# Patient Record
Sex: FEMALE | Race: WHITE
[De-identification: names, ages, dates, MRNs, and addresses within clinical notes are randomized per-mention and may not be internally consistent; named-entity substitution may affect disease eponyms.]

---

## 2017-01-03 ENCOUNTER — HOSPITAL ENCOUNTER (EMERGENCY)
Dept: HOSPITAL 45 - C.EDB | Age: 42
Discharge: HOME | End: 2017-01-03
Payer: COMMERCIAL

## 2017-01-03 VITALS — TEMPERATURE: 99.14 F

## 2017-01-03 VITALS
BODY MASS INDEX: 37.89 KG/M2 | WEIGHT: 208.56 LBS | HEIGHT: 62.01 IN | WEIGHT: 208.56 LBS | BODY MASS INDEX: 37.89 KG/M2 | HEIGHT: 62.01 IN

## 2017-01-03 VITALS — DIASTOLIC BLOOD PRESSURE: 98 MMHG | SYSTOLIC BLOOD PRESSURE: 142 MMHG | OXYGEN SATURATION: 96 % | HEART RATE: 111 BPM

## 2017-01-03 DIAGNOSIS — I10: ICD-10-CM

## 2017-01-03 DIAGNOSIS — Z98.51: ICD-10-CM

## 2017-01-03 DIAGNOSIS — Z83.3: ICD-10-CM

## 2017-01-03 DIAGNOSIS — Z90.49: ICD-10-CM

## 2017-01-03 DIAGNOSIS — J44.9: ICD-10-CM

## 2017-01-03 DIAGNOSIS — Z80.9: ICD-10-CM

## 2017-01-03 DIAGNOSIS — J20.9: Primary | ICD-10-CM

## 2017-01-03 DIAGNOSIS — Z87.891: ICD-10-CM

## 2017-01-03 DIAGNOSIS — Z87.828: ICD-10-CM

## 2017-01-03 DIAGNOSIS — Z91.81: ICD-10-CM

## 2017-01-03 DIAGNOSIS — R00.0: ICD-10-CM

## 2017-01-03 DIAGNOSIS — D72.829: ICD-10-CM

## 2017-01-03 DIAGNOSIS — J45.909: ICD-10-CM

## 2017-01-03 DIAGNOSIS — Z86.19: ICD-10-CM

## 2017-01-03 DIAGNOSIS — Z87.440: ICD-10-CM

## 2017-01-03 DIAGNOSIS — Z82.49: ICD-10-CM

## 2017-01-03 LAB
ALP SERPL-CCNC: 52 U/L (ref 45–117)
ALT SERPL-CCNC: 30 U/L (ref 12–78)
ANION GAP SERPL CALC-SCNC: 13 MMOL/L (ref 3–11)
AST SERPL-CCNC: 12 U/L (ref 15–37)
BASOPHILS # BLD: 0.03 K/UL (ref 0–0.2)
BASOPHILS NFR BLD: 0.2 %
BUN SERPL-MCNC: 6 MG/DL (ref 7–18)
BUN/CREAT SERPL: 6.9 (ref 10–20)
CALCIUM SERPL-MCNC: 8.8 MG/DL (ref 8.5–10.1)
CHLORIDE SERPL-SCNC: 102 MMOL/L (ref 98–107)
CKMB/CK RATIO: 1.6 (ref 0–3)
CO2 SERPL-SCNC: 23 MMOL/L (ref 21–32)
COMPLETE: YES
CREAT CL PREDICTED SERPL C-G-VRATE: 97 ML/MIN
CREAT SERPL-MCNC: 0.81 MG/DL (ref 0.6–1.2)
EOSINOPHIL NFR BLD AUTO: 317 K/UL (ref 130–400)
GLUCOSE SERPL-MCNC: 103 MG/DL (ref 70–99)
HCT VFR BLD CALC: 40.3 % (ref 37–47)
IG%: 0.3 %
IMM GRANULOCYTES NFR BLD AUTO: 10.6 %
INR PPP: 1 (ref 0.9–1.1)
LYMPHOCYTES # BLD: 1.89 K/UL (ref 1.2–3.4)
MAGNESIUM SERPL-MCNC: 2.2 MG/DL (ref 1.8–2.4)
MCH RBC QN AUTO: 30.3 PG (ref 25–34)
MCHC RBC AUTO-ENTMCNC: 35 G/DL (ref 32–36)
MCV RBC AUTO: 86.7 FL (ref 80–100)
MONOCYTES NFR BLD: 7.1 %
NEUTROPHILS # BLD AUTO: 0.2 %
NEUTROPHILS NFR BLD AUTO: 81.6 %
PARTIAL THROMBOPLASTIN RATIO: 1.2
PMV BLD AUTO: 11 FL (ref 7.4–10.4)
POINT OF CARE TROPONIN I: 0 NG/ML (ref 0–0.04)
POTASSIUM SERPL-SCNC: 3.7 MMOL/L (ref 3.5–5.1)
PROTHROMBIN TIME: 10.9 SECONDS (ref 9–12)
RBC # BLD AUTO: 4.65 M/UL (ref 4.2–5.4)
SODIUM SERPL-SCNC: 138 MMOL/L (ref 136–145)
WBC # BLD AUTO: 17.82 K/UL (ref 4.8–10.8)

## 2017-01-03 NOTE — DIAGNOSTIC IMAGING REPORT
CHEST ONE VIEW PORTABLE



HISTORY:      Short of breath. Tachycardia.



COMPARISON: Chest 1/15/2016.



FINDINGS: The lungs are clear. Cardiac silhouette is normal in size. No pleural

effusions. No pneumothorax.



IMPRESSION:

No acute process.







Electronically signed by:  Joshua Streeter M.D.

1/3/2017 6:30 PM

## 2017-01-03 NOTE — EMERGENCY ROOM VISIT NOTE
History


Report prepared by Angel:  Rebecca Cota


Under the Supervision of:  Dr. Sri Blandon M.D.


First contact with patient:  17:30


Chief Complaint:  COUGH


Stated Complaint:  CHEST BURNING WHEN I COUGH, LT SHOULDER/COLLARBONE


Nursing Triage Summary:  


Chest congestion and burning when coughing.  Cant breathe right when she is 


laying down.  Left shoulder and collarbone are "out"  Has been out since .





History of Present Illness


The patient is a 41 year old female who presents to the Emergency Room with 

complaints of a worsening cough since yesterday morning. She states that her 

chest feels congested and "I can feel my lungs burning when I cough." She rates 

her pain as an 8/10. She is feeling lightheaded from coughing. The patient 

notes low-grade fevers. She has a personal history of asthma. She has been 

using her albuterol inhaler and breathing treatments for her symptoms.





   Source of History:  patient


   Onset:  yesterday morning


   Position:  chest (respiratory)


   Symptom Intensity:  8/10


   Quality:  burning


   Timing:  worsening


   Modifying Factors (Relieving):  other (breathing treatments/albuterol 

inhalers)


   Associated Symptoms:  + fevers


Note:


Pt notes lightheadedness.





Review of Systems


See HPI for pertinent positives & negatives. A total of 10 systems reviewed and 

were otherwise negative.





Past Medical & Surgical


Medical Problems:


(1) Acute bronchitis


(2) Acute bronchitis


(3) Acute bronchitis


(4) Ankle sprain


(5) Asthma exacerbation


(6) Asthma exacerbation


(7) Asthma exacerbation


(8) ASTHMA, UNSPECIFIED


(9) Asthmatic bronchitis


(10) Asthmatic bronchitis


(11) Back pain


(12) Back strain


(13) Bilateral otitis media


(14) Chest pain


(15) CHRONIC OBSTRUCTIVE ASTHMA, W (ACUTE) EXACERBATION


(16) Contusion of back wall of thorax


(17) COPD exacerbation


(18) Fall due to ice or snow


(19) FAM HX-DIABETES MELLITUS


(20) FAM HX-ISCHEM HEART DIS


(21) FAMILY HISTORY OF OTHER CARDIOVASCULAR DISEASES


(22) FAMILY HISTORY OF OTHER CARDIOVASCULAR DISEASES             


(23) FAMILY HX-MALIGNANCY NOS


(24) HISTORY OF TOBACCO USE


(25) Hypertension


(26) LUMBAGO


(27) MIGRAINE UNSPECIFIED W/O INTRACT MGRN W/O STATUS MIGRAINOSUS


(28) Rib pain


(29) Shortness of breath


(30) Spasm of back muscles


(31) urinary tract infection


Surgical Problems:


(1)  section


(2) Cholecystectomy


(3) Tubal ligation








Family History





Cancer


Diabetes mellitus


Heart disease


Hypertension


Lung disease





Social History


Smoking Status:  Former Smoker


Alcohol Use:  none


Drug Use:  none


Marital Status:  in relationship


Housing Status:  lives with friends


Occupation Status:  employed





Current/Historical Medications


Scheduled


Azithromycin (Zithromax Z-Talat), 0 PO UD


Mometasone Furoate-Formoterol (Dulera 100/5 Mcg), 2 PUFFS INH BID


Prednisone (Prednisone), 10 MG PO AS DIRECTED





Scheduled PRN


Albuterol Inhaler (Ventolin Inhaler), 2 PUFFS INH Q4H PRN for SOB/Wheezing


Albuterol Sulf (Proventil 0.083% 2.5MG/3ML), 3 ML NEB QID PRN for SOB/Wheezing


Hydrocodone W/ Homatropine (Hycodan 5/1.5MG 5 Ml), 10 ML PO Q4H PRN for Cough


Ibuprofen (Advil), 1,000 MG PO Q4-6HRS PRN for Pain


Meloxicam (Meloxicam), 15 MG PO DAILY PRN for Arthritis Paim





Allergies


Coded Allergies:  


     No Known Allergies (Unverified , 16)





Physical Exam


Vital Signs











  Date Time  Temp Pulse Resp B/P Pulse Ox O2 Delivery O2 Flow Rate FiO2


 


1/3/17 19:58  111 18 142/98 96   


 


1/3/17 18:30  114 20 119/85 94   


 


1/3/17 17:53  120      


 


1/3/17 16:39     93 Room Air  


 


1/3/17 16:36 37.3 136 18 148/93 93 Room Air  











Physical Exam


Vital signs reviewed.





General: Well-appearing 41 year old female, in no significant distress.





HEENT: No scleral icterus, PERRLA, neck supple.  Atraumatic.





Cardiovascular: Tachycardic rate and regular rhythm, no extra sounds.





Pulmonary: Wheezing throughout both lung fields, normal work of breathing.





Abdomen: Soft, nontender, nondistended, positive bowel sounds.





Musculoskeletal: Atraumatic, no peripheral edema.





Neurologic: Patient awake alert and oriented x 3, full strength in all 4 

extremities.  Cranial nerves 2 through 12 grossly intact.





Skin: Warm, dry, no rash





Medical Decision & Procedures


ER Provider


Diagnostic Interpretation:


Radiology results as stated below per my review and radiologist interpretation:








CHEST ONE VIEW PORTABLE





HISTORY:      Short of breath. Tachycardia.





COMPARISON: Chest 1/15/2016.





FINDINGS: The lungs are clear. Cardiac silhouette is normal in size. No pleural


effusions. No pneumothorax.





IMPRESSION:


No acute process.








Electronically signed by:  Joshua Streeter M.D.


1/3/2017 6:30 PM





Laboratory Results


1/3/17 18:15








Red Blood Count 4.65, Mean Corpuscular Volume 86.7, Mean Corpuscular Hemoglobin 

30.3, Mean Corpuscular Hemoglobin Concent 35.0, Mean Platelet Volume 11.0, 

Neutrophils (%) (Auto) 81.6, Lymphocytes (%) (Auto) 10.6, Monocytes (%) (Auto) 

7.1, Eosinophils (%) (Auto) 0.2, Basophils (%) (Auto) 0.2, Neutrophils # (Auto) 

14.54, Lymphocytes # (Auto) 1.89, Monocytes # (Auto) 1.27, Eosinophils # (Auto) 

0.03, Basophils # (Auto) 0.03





1/3/17 18:15

















Test


  1/3/17


18:15 1/3/17


18:23


 


White Blood Count


  17.82 K/uL


(4.8-10.8) 


 


 


Red Blood Count


  4.65 M/uL


(4.2-5.4) 


 


 


Hemoglobin


  14.1 g/dL


(12.0-16.0) 


 


 


Hematocrit 40.3 % (37-47)  


 


Mean Corpuscular Volume


  86.7 fL


() 


 


 


Mean Corpuscular Hemoglobin


  30.3 pg


(25-34) 


 


 


Mean Corpuscular Hemoglobin


Concent 35.0 g/dl


(32-36) 


 


 


Platelet Count


  317 K/uL


(130-400) 


 


 


Mean Platelet Volume


  11.0 fL


(7.4-10.4) 


 


 


Neutrophils (%) (Auto) 81.6 %  


 


Lymphocytes (%) (Auto) 10.6 %  


 


Monocytes (%) (Auto) 7.1 %  


 


Eosinophils (%) (Auto) 0.2 %  


 


Basophils (%) (Auto) 0.2 %  


 


Neutrophils # (Auto)


  14.54 K/uL


(1.4-6.5) 


 


 


Lymphocytes # (Auto)


  1.89 K/uL


(1.2-3.4) 


 


 


Monocytes # (Auto)


  1.27 K/uL


(0.11-0.59) 


 


 


Eosinophils # (Auto)


  0.03 K/uL


(0-0.5) 


 


 


Basophils # (Auto)


  0.03 K/uL


(0-0.2) 


 


 


RDW Standard Deviation


  42.8 fL


(36.4-46.3) 


 


 


RDW Coefficient of Variation


  13.7 %


(11.5-14.5) 


 


 


Immature Granulocyte % (Auto) 0.3 %  


 


Immature Granulocyte # (Auto)


  0.06 K/uL


(0.00-0.02) 


 


 


Prothrombin Time


  10.9 SECONDS


(9.0-12.0) 


 


 


Prothromb Time International


Ratio 1.0 (0.9-1.1) 


  


 


 


Activated Partial


Thromboplast Time 31.0 SECONDS


(21.0-31.0) 


 


 


Partial Thromboplastin Ratio 1.2  


 


Anion Gap


  13.0 mmol/L


(3-11) 


 


 


Est Creatinine Clear Calc


Drug Dose 97.0 ml/min 


  


 


 


Estimated GFR (


American) 103.8 


  


 


 


Estimated GFR (Non-


American 89.6 


  


 


 


BUN/Creatinine Ratio 6.9 (10-20)  


 


Calcium Level


  8.8 mg/dl


(8.5-10.1) 


 


 


Magnesium Level


  2.2 mg/dl


(1.8-2.4) 


 


 


Total Bilirubin


  0.5 mg/dl


(0.2-1) 


 


 


Direct Bilirubin


  0.1 mg/dl


(0-0.2) 


 


 


Aspartate Amino Transf


(AST/SGOT) 12 U/L (15-37) 


  


 


 


Alanine Aminotransferase


(ALT/SGPT) 30 U/L (12-78) 


  


 


 


Alkaline Phosphatase


  52 U/L


() 


 


 


Total Creatine Kinase


  73 U/L


() 


 


 


Creatine Kinase MB


  1.2 ng/ml


(0.5-3.6) 


 


 


Creatine Kinase MB Ratio 1.6 (0-3.0)  


 


Total Protein


  7.7 gm/dl


(6.4-8.2) 


 


 


Albumin


  3.5 gm/dl


(3.4-5.0) 


 


 


Bedside D-Dimer


  


  268 ng/mlFEU


(0-450)


 


Bedside Troponin I


  


  0.000 ng/ml


(0-0.045)





Laboratory results per my review.





Medications Administered











 Medications


  (Trade)  Dose


 Ordered  Sig/Janice


 Route  Start Time


 Stop Time Status Last Admin


Dose Admin


 


 Sodium Chloride  500 ml @ 


 999 mls/hr  Q31M STAT


 IV  1/3/17 17:55


 1/3/17 18:25 DC 1/3/17 17:55


999 MLS/HR


 


 Sodium Chloride


  (Nss 1000ml)  1,000 ml @ 


 125 mls/hr  Q8H STAT


 IV  1/3/17 17:55


 1/3/17 20:25 DC 1/3/17 17:55


125 MLS/HR


 


 Albuterol/


 Ipratropium


  (Duoneb)  3 ml  NOW  STAT


 INH  1/3/17 17:55


 1/3/17 17:57 DC 1/3/17 18:29


3 ML


 


 Methylprednisolone


 Sodium Succinate


  (Solu-Medrol IV)  125 mg  NOW  STAT


 IV  1/3/17 18:03


 1/3/17 18:04 DC 1/3/17 18:28


125 MG











ECG


Indication:  SOB/dyspnea


Rate (beats per minute):  111


Rhythm:  sinus tachycardia


Findings:  no acute ischemic change, no ectopy





ED Course


: Past medical records reviewed. The patient was evaluated in room B2. A 

complete history and physical examination was performed. 





175: Duoneb 3 ml INH, NSS 1000 ml @ 125 mls/hr IV,  ml @ 999 mls/hr IV





1803: Solu-Medrol 125 mg IV 





: I reassessed the patient at this time. She is feeling better and resting 

comfortably. I discussed the results and treatment plan with the patient. I 

answered all pertaining questions that she had. She expressed understanding and 

verbalized agreement. The patient will be discharged home.





Medical Decision


Differential diagnosis:


Etiologies such as infections, reactive airway disease, pneumonia, pneumothorax

, COPD, CHF, cardiac ischemia, pulmonary embolism, musculoskeletal, 

gastrointestinal, as well as others were entertained.





This pt was evaluated and appeared to be in no distress.  IV access was 

obtained and lab work was drawn.  EKG reveals a ST, CXR is clear.  Lab work 

reveals a leukocytosis.  Pt was hydrated with NSS and given a duoneb treatment.

  She had some resolution of her symptoms.  D-dimer was normal.  Pt was placed 

on azithromycin, prednisone taper and to continue albuterol as needed.  She 

will f/u with her PCP this week and return to the ED for worsening of symptoms 

or any medical concerns.





Impression





 Primary Impression:  Acute bronchitis


 Additional Impression:  Wheezing





Scribe Attestation


The scribe's documentation has been prepared under my direction and personally 

reviewed by me in its entirety. I confirm that the note above accurately 

reflects all work, treatment, procedures, and medical decision making performed 

by me.





Departure Information


Dispostion


Home / Self-Care





Prescriptions





Azithromycin (ZITHROMAX Z-TALAT) 250 Mg Tab


0 PO UD, #1 PKT


   2 TABS DAY 1, THEN 1 TAB DAILY FOR 4 DAYS


   Prov: Sri Blandon M.D.         1/3/17 


Hydrocodone W/ Homatropine (HYCODAN 5/1.5MG 5 ML) 1 Syp Syp


10 ML PO Q4H Y for Cough, #100 ML


   Prov: Sri Blandon M.D.         1/3/17 


Prednisone (Prednisone) 10 Mg Tab


10 MG PO AS DIRECTED, #31 TAB


   40 mg for 4 days, 30 mL for 3 days, 20 mg for 2 days, 10 mg for 2 days


   Prov: Sri Blandon M.D.         1/3/17





Referrals


SELMA Amezcua M.D. (MEDICAL) (PCP)





Forms


HOME CARE DOCUMENTATION FORM,                                                 

               IMPORTANT VISIT INFORMATION





Patient Instructions


A Signature Page, My [facility]





Additional Instructions





Diagnosis: Viral URI, wheezing, cough





Albuterol inhaler 2 puffs every 4 hours as needed for cough or wheezing.





Azithromycin 500 mg today, 250 mg daily for the next 4 days.





Prednisone 40 mg for 4 days, 30 mg for 3 days, 20 mL for 2 days, 10 mg for 2 

days.





Hycodan syrup 2 teaspoons every 4 hours as needed for cough.  Do not drive 

after taking this medication.





Follow-up with your physician this week for reevaluation.





Return to the ER for worsening of symptoms or any medical concerns.

## 2017-03-28 ENCOUNTER — HOSPITAL ENCOUNTER (EMERGENCY)
Dept: HOSPITAL 45 - C.EDB | Age: 42
Discharge: HOME | End: 2017-03-28
Payer: COMMERCIAL

## 2017-03-28 VITALS — DIASTOLIC BLOOD PRESSURE: 78 MMHG | OXYGEN SATURATION: 98 % | SYSTOLIC BLOOD PRESSURE: 126 MMHG | HEART RATE: 94 BPM

## 2017-03-28 VITALS
WEIGHT: 210.98 LBS | BODY MASS INDEX: 38.34 KG/M2 | HEIGHT: 62.01 IN | WEIGHT: 210.98 LBS | HEIGHT: 62.01 IN | BODY MASS INDEX: 38.34 KG/M2

## 2017-03-28 VITALS — TEMPERATURE: 98.42 F

## 2017-03-28 DIAGNOSIS — J44.9: ICD-10-CM

## 2017-03-28 DIAGNOSIS — Z87.440: ICD-10-CM

## 2017-03-28 DIAGNOSIS — Z83.3: ICD-10-CM

## 2017-03-28 DIAGNOSIS — J45.909: ICD-10-CM

## 2017-03-28 DIAGNOSIS — Z90.49: ICD-10-CM

## 2017-03-28 DIAGNOSIS — I10: ICD-10-CM

## 2017-03-28 DIAGNOSIS — R51: Primary | ICD-10-CM

## 2017-03-28 DIAGNOSIS — Z80.9: ICD-10-CM

## 2017-03-28 DIAGNOSIS — F17.200: ICD-10-CM

## 2017-03-28 DIAGNOSIS — Z82.49: ICD-10-CM

## 2017-03-28 DIAGNOSIS — Z98.51: ICD-10-CM

## 2017-03-28 DIAGNOSIS — Z87.828: ICD-10-CM

## 2017-03-28 LAB
ANION GAP SERPL CALC-SCNC: 8 MMOL/L (ref 3–11)
BUN SERPL-MCNC: 10 MG/DL (ref 7–18)
BUN/CREAT SERPL: 12.6 (ref 10–20)
CALCIUM SERPL-MCNC: 8.8 MG/DL (ref 8.5–10.1)
CHLORIDE SERPL-SCNC: 104 MMOL/L (ref 98–107)
CO2 SERPL-SCNC: 26 MMOL/L (ref 21–32)
CREAT CL PREDICTED SERPL C-G-VRATE: 98.8 ML/MIN
CREAT SERPL-MCNC: 0.8 MG/DL (ref 0.6–1.2)
EOSINOPHIL NFR BLD AUTO: 372 K/UL (ref 130–400)
GLUCOSE SERPL-MCNC: 100 MG/DL (ref 70–99)
HCT VFR BLD CALC: 43.2 % (ref 37–47)
MCH RBC QN AUTO: 31.1 PG (ref 25–34)
MCHC RBC AUTO-ENTMCNC: 35.2 G/DL (ref 32–36)
MCV RBC AUTO: 88.3 FL (ref 80–100)
PMV BLD AUTO: 10.6 FL (ref 7.4–10.4)
POTASSIUM SERPL-SCNC: 4.1 MMOL/L (ref 3.5–5.1)
RBC # BLD AUTO: 4.89 M/UL (ref 4.2–5.4)
SODIUM SERPL-SCNC: 138 MMOL/L (ref 136–145)
WBC # BLD AUTO: 10.82 K/UL (ref 4.8–10.8)

## 2017-03-28 NOTE — EMERGENCY ROOM VISIT NOTE
History


Report prepared by Angel:  Sergio Faustin


Under the Supervision of:  Dr. Mikael León M.D.


First contact with patient:  19:47


Chief Complaint:  HEAD PAIN


Stated Complaint:  LUMP ON BACK LEFT SIDE OF HEAD





History of Present Illness


The patient is a 41 year old female who presents to the Emergency Room with 

complaints of constant head pain in the back left of her head starting 

yesterday morning around 0930 in the morning. She currently rates her 

discomfort as an 8/10 in severity. The patient states that she was sitting on 

the couch, and she moved her head and had shooting pain on her head, and she 

felt a lump on the back of her head. She denies any falls or loss of 

consciousness. The patient states that she has a history of passing out and 

vertigo which she does not take medication for anymore. She states that this 

pain is going down her neck and into her arm. The patient states that she has a 

history of migraines, and she gets nauseous if she reads too much. She denies 

any fevers, abnormal coughs, or photophobia. She states that she has a history 

of asthma, COPD, and she uses an inhaler, and ibuprofen as needed. She took 

ibuprofen around 1500 today.





   Source of History:  patient


   Onset:  yesterday morning around 0930


   Position:  head


   Symptom Intensity:  8/10


   Quality:  other (shooting)


   Timing:  constant


   Associated Symptoms:  + nausea, No LOC, No cough, No fevers





Review of Systems


See HPI for pertinent positives & negatives. A total of 10 systems reviewed and 

were otherwise negative.





Past Medical & Surgical


Medical Problems:


(1) Acute bronchitis


(2) Acute bronchitis


(3) Acute bronchitis


(4) Ankle sprain


(5) Asthma exacerbation


(6) Asthma exacerbation


(7) Asthma exacerbation


(8) ASTHMA, UNSPECIFIED


(9) Asthmatic bronchitis


(10) Asthmatic bronchitis


(11) Back pain


(12) Back strain


(13) Bilateral otitis media


(14) Chest pain


(15) CHRONIC OBSTRUCTIVE ASTHMA, W (ACUTE) EXACERBATION


(16) Contusion of back wall of thorax


(17) COPD exacerbation


(18) Fall due to ice or snow


(19) FAM HX-DIABETES MELLITUS


(20) FAM HX-ISCHEM HEART DIS


(21) FAMILY HISTORY OF OTHER CARDIOVASCULAR DISEASES


(22) FAMILY HISTORY OF OTHER CARDIOVASCULAR DISEASES             


(23) FAMILY HX-MALIGNANCY NOS


(24) HISTORY OF TOBACCO USE


(25) Hypertension


(26) LUMBAGO


(27) MIGRAINE UNSPECIFIED W/O INTRACT MGRN W/O STATUS MIGRAINOSUS


(28) Rib pain


(29) Shortness of breath


(30) Spasm of back muscles


(31) urinary tract infection


Surgical Problems:


(1)  section


(2) Cholecystectomy


(3) Tubal ligation








Family History





Cancer


Diabetes mellitus


Heart disease


Hypertension


Lung disease





Social History


Smoking Status:  Current Every Day Smoker


Alcohol Use:  none


Drug Use:  none


Marital Status:  in relationship


Housing Status:  lives with friends


Occupation Status:  employed





Current/Historical Medications


Scheduled


Mometasone Furoate-Formoterol (Dulera 100/5 Mcg), 2 PUFFS INH BID





Scheduled PRN


Albuterol Hfa (Ventolin Hfa), 2 PUFFS INH Q4 PRN for Pain


Albuterol Sulf (Proventil 0.083% 2.5MG/3ML), 3 ML NEB QID PRN for SOB/Wheezing


Ibuprofen (Advil), 400-600 MG PO Q4-6HRS PRN for Pain


Meloxicam (Meloxicam), 15 MG PO DAILY PRN for Arthritis Paim





Allergies


Coded Allergies:  


     No Known Allergies (Unverified , 16)





Physical Exam


Vital Signs











  Date Time  Temp Pulse Resp B/P Pulse Ox O2 Delivery O2 Flow Rate FiO2


 


3/28/17 21:57  94 16 126/78 98 Room Air  


 


3/28/17 20:00  89 22 165/127 94   


 


3/28/17 18:04 36.9 111 18 143/99 94 Room Air  











Physical Exam


GENERAL: Patient is in no acute distress.


HEENT: No scalp hematoma. No posterior occipital adenopathy. Tenderness over 

the left posterior occipital scalp.  No rash.  No acute trauma, normocephalic 

atraumatic, mucous membranes moist, no nasal congestion, no scleral icterus.


NECK: No stridor, no adenopathy, no meningismus, trachea is midline.


LUNGS: Clear to auscultation bilaterally, no wheeze, no rhonchi, breath sounds 

equal.


HEART: Without murmurs gallops or rubs, regular rate and rhythm.


ABDOMEN: Soft, nontender, bowel sounds positive, no hernias, no peritonitis.


EXTREMITIES: No cyanosis or edema, full range of motion of all the joints 

without pain or difficulty, no signs for acute trauma.


NEUROLOGIC: Oriented x 3, no acute motor or sensory deficits, no focal weakness.


SKIN: No rash, no jaundice, no diaphoresis.





Medical Decision & Procedures


ER Provider


Diagnostic Interpretation:


CT results as stated below per my review and radiologist interpretation: 





CT SCAN OF THE BRAIN WITHOUT IV CONTRAST





CLINICAL HISTORY: Head injury.





COMPARISON STUDY:  CT of the brain dated 2013.





TECHNIQUE: Unenhanced axial CT scan of the brain is performed from the vertex to


the skull base. Automated dose control exposure was utilized.





CT DOSE: 773.57 mGy.cm





FINDINGS:





Brain parenchyma: The brain parenchyma is normal in appearance. There is no


hemorrhage, mass effect, or evidence of acute territorial ischemia by CT


criteria. Gray-white matter is preserved. No extra-axial fluid collection is


seen.





Ventricles, sulci, cisterns: Normal in configuration.





Intracranial vasculature: The visualized intracranial vasculature at the skull


base is normal in appearance.





Calvarium: There is no depressed calvarial fracture. The patient is edentulous.





Sinuses and mastoids: The visualized paranasal sinuses are clear. The mastoid


air cells are well pneumatized.





Orbits: The bony orbits are grossly intact.








IMPRESSION: No acute intracranial abnormality.





Electronically signed by:  Mikael García M.D.


3/28/2017 9:30 PM





Dictated Date/Time:  3/28/2017 9:28 PM





Laboratory Results


3/28/17 20:40








3/28/17 20:40

















Test


  3/28/17


20:40


 


Red Blood Count


  4.89 M/uL


(4.2-5.4)


 


Mean Corpuscular Volume


  88.3 fL


()


 


Mean Corpuscular Hemoglobin


  31.1 pg


(25-34)


 


Mean Corpuscular Hemoglobin


Concent 35.2 g/dl


(32-36)


 


RDW Standard Deviation


  45.5 fL


(36.4-46.3)


 


RDW Coefficient of Variation


  14.1 %


(11.5-14.5)


 


Mean Platelet Volume


  10.6 fL


(7.4-10.4)


 


Anion Gap


  8.0 mmol/L


(3-11)


 


Est Creatinine Clear Calc


Drug Dose 98.8 ml/min 


 


 


Estimated GFR (


American) 105.4 


 


 


Estimated GFR (Non-


American 90.9 


 


 


BUN/Creatinine Ratio 12.6 (10-20) 


 


Calcium Level


  8.8 mg/dl


(8.5-10.1)








Laboratory results reviewed by me.





Medications Administered











 Medications


  (Trade)  Dose


 Ordered  Sig/Janice


 Route  Start Time


 Stop Time Status Last Admin


Dose Admin


 


 Sodium Chloride  1,000 ml @ 


 999 mls/hr  Q1H1M STAT


 IV  3/28/17 20:29


 3/28/17 21:29 DC 3/28/17 20:54


999 MLS/HR


 


 Prochlorperazine


 Edisylate/Syringe


  (Compazine Inj/


 Syringe)  10 ml @ 5


 mls/min  NOW  STAT


 IV  3/28/17 20:30


 3/28/17 20:32 DC 3/28/17 20:54


5 MLS/MIN


 


 Diphenhydramine


 HCl


  (Benadryl Inj)  50 mg  NOW  STAT


 IV  3/28/17 20:30


 3/28/17 20:32 DC 3/28/17 20:56


50 MG


 


 Ketorolac


 Tromethamine


  (Toradol Inj)  30 mg  NOW  STAT


 IV  3/28/17 20:30


 3/28/17 20:32 DC 3/28/17 20:55


30 MG











ECG


Indication:  other (head pain)


Rate (beats per minute):  92


Rhythm:  normal sinus


Findings:  no acute ischemic change, no ectopy





ED Course


2020: The patient was evaluated in room C7. A complete history and physical 

exam was performed.





: Sodium Chloride 1000 ml @ 999 mls/hr IV





2030: Toradol Inj 30mg IV, Benadryl Inj 50mg IV, Prochlorperazine Edisylate 10mg

/ Syringe 10ml @ 5 mls/min





8: Reevaluated the patient. Discussed results and discharge instructions: 

She verbalized understanding and agreement. The patient is ready for discharge.





Medical Decision


The patient is a 41 year old female who presents to the ED with complaints of 

head pain.  Differential diagnoses considered include migraine headache, 

intracranial bleeding, head injury, occipital neuralgia, syncope, anemia, 

infection, electrolyte abnormality.





There is no significant leukocytosis or concerning anemia.  No significant 

electrolyte abnormality or kidney failure.  Brain CT shows no acute bleed or 

mass effect.  EKG shows a sinus rhythm, no acute ischemia.  On exam, there are 

no focal neurologic deficits.  The patient is not toxic or febrile.  There is 

no meningismus.





The patient presents with a left sided posterior headache.  She has had some 

nausea.  She has a history of migraines.





Patient received IV Toradol, IV Benadryl, IV Compazine and IV saline, she feels 

significantly improved.  She was reassured.  I do think she can be discharged 

home.  Her headache is likely migrainous.  She has no recollection of any 

syncopal event in the last few days-I highly doubt syncope as a cause for her 

trouble.





Impression





 Primary Impression:  


 Left-sided headache





Scribe Attestation


The scribe's documentation has been prepared under my direction and personally 

reviewed by me in its entirety. I confirm that the note above accurately 

reflects all work, treatment, procedures, and medical decision making performed 

by me.





Departure Information


Dispostion


Home / Self-Care





Referrals


SELMA Amezcua M.D. (MEDICAL) (PCP)





Forms


HOME CARE DOCUMENTATION FORM,                                                 

               IMPORTANT VISIT INFORMATION, WORK / SCHOOL INSTRUCTIONS





Patient Instructions


Headache Pain, [facility] [employer] [facility]





Additional Instructions





-If  symptoms worsens or you develop fevers >100.4 nausea/vomiting please call 

clinic or return to Emergency Department


-Please make sure you are well hydrated at home


-Follow up with Primary Care provider this week for further management

## 2017-03-28 NOTE — DIAGNOSTIC IMAGING REPORT
CT SCAN OF THE BRAIN WITHOUT IV CONTRAST



CLINICAL HISTORY: Head injury.



COMPARISON STUDY:  CT of the brain dated 5/28/2013.



TECHNIQUE: Unenhanced axial CT scan of the brain is performed from the vertex to

the skull base. Automated dose control exposure was utilized.



CT DOSE: 773.57 mGy.cm



FINDINGS:



Brain parenchyma: The brain parenchyma is normal in appearance. There is no

hemorrhage, mass effect, or evidence of acute territorial ischemia by CT

criteria. Gray-white matter is preserved. No extra-axial fluid collection is

seen.



Ventricles, sulci, cisterns: Normal in configuration.



Intracranial vasculature: The visualized intracranial vasculature at the skull

base is normal in appearance.



Calvarium: There is no depressed calvarial fracture. The patient is edentulous.



Sinuses and mastoids: The visualized paranasal sinuses are clear. The mastoid

air cells are well pneumatized.



Orbits: The bony orbits are grossly intact.





IMPRESSION: No acute intracranial abnormality.







Electronically signed by:  Mikael García M.D.

3/28/2017 9:30 PM



Dictated Date/Time:  3/28/2017 9:28 PM

## 2017-03-28 NOTE — EMERGENCY ROOM VISIT NOTE
History


First contact with patient:  19:49


Chief Complaint:  HEAD PAIN


Stated Complaint:  LUMP ON BACK LEFT SIDE OF HEAD





History of Present Illness


The patient is a 41 year old female smoker with hx of Asthma, COPD, Vertigo who 

presents to the Emergency Room with complaints of lump of back Head and 

associated pain. She reports  1 day prior to arrival she was sitting on the 

couch and felt a sharp pain on the back of her head. The pain was 9/10 and she 

also noted pain radiating down or LUE with additional sensation of numbness and 

tingling. The numbness  and tingling resolved after a few minutes but  pain at 

the area of the lump remained throughout the day.  She does not recall LOC, 

Head injury. She has been taking Ibuprofen 400-600mg q4hrs She denies CP, SOB, 

Palpitation,





Review of Systems


See HPI for pertinent positives & negatives. A total of 10 systems reviewed and 

were otherwise negative.





Past Medical/Surgical History


Medical Problems:


(1) Acute bronchitis


(2) Acute bronchitis


(3) Acute bronchitis


(4) Ankle sprain


(5) Asthma exacerbation


(6) Asthma exacerbation


(7) Asthma exacerbation


(8) ASTHMA, UNSPECIFIED


(9) Asthmatic bronchitis


(10) Asthmatic bronchitis


(11) Back pain


(12) Back strain


(13) Bilateral otitis media


(14) Chest pain


(15) CHRONIC OBSTRUCTIVE ASTHMA, W (ACUTE) EXACERBATION


(16) Contusion of back wall of thorax


(17) COPD exacerbation


(18) Fall due to ice or snow


(19) FAM HX-DIABETES MELLITUS


(20) FAM HX-ISCHEM HEART DIS


(21) FAMILY HISTORY OF OTHER CARDIOVASCULAR DISEASES


(22) FAMILY HISTORY OF OTHER CARDIOVASCULAR DISEASES             


(23) FAMILY HX-MALIGNANCY NOS


(24) HISTORY OF TOBACCO USE


(25) Hypertension


(26) LUMBAGO


(27) MIGRAINE UNSPECIFIED W/O INTRACT MGRN W/O STATUS MIGRAINOSUS


(28) Rib pain


(29) Shortness of breath


(30) Spasm of back muscles


(31) urinary tract infection


Surgical Problems:


(1)  section


(2) Cholecystectomy


(3) Tubal ligation








Family History





Cancer


Diabetes mellitus


Heart disease


Hypertension


Lung disease





Social History


Smoking Status:  Current Every Day Smoker


Alcohol Use:  none


Drug Use:  none


Marital Status:  in relationship


Housing Status:  lives with friends


Occupation Status:  employed





Current/Historical Medications


Scheduled


Mometasone Furoate-Formoterol (Dulera 100/5 Mcg), 2 PUFFS INH BID





Scheduled PRN


Albuterol Hfa (Ventolin Hfa), 2 PUFFS INH Q4 PRN for Pain


Albuterol Sulf (Proventil 0.083% 2.5MG/3ML), 3 ML NEB QID PRN for SOB/Wheezing


Ibuprofen (Advil), 400-600 MG PO Q4-6HRS PRN for Pain


Meloxicam (Meloxicam), 15 MG PO DAILY PRN for Arthritis [place]aim





Allergies


Coded Allergies:  


     No Known Allergies (Unverified , 16)





Physical Exam


Vital Signs











  Date Time  Temp Pulse Resp B/P Pulse Ox O2 Delivery O2 Flow Rate FiO2


 


3/28/17 21:57  94 16 126/78 98 Room Air  


 


3/28/17 20:00  89 22 165/127 94   


 


3/28/17 18:04 36.9 111 18 143/99 94 Room Air  











Physical Exam


GENERAL: alert, well appearing, well nourished, no distress, non-toxic 


EYE EXAM: normal conjunctiva, PERRL and EOM's grossly intact


OROPHARYNX: no exudate, no erythema, lips, buccal mucosa, and tongue normal and 

mucous membranes are moist


NECK: supple, no nuchal rigidity, no adenopathy, non-tender


LUNGS: Clear to auscultation. Normal chest wall mechanics


HEART: no murmurs, S1 normal and S2 normal 


ABDOMEN: abdomen soft, non-tender, normo-active bowel sounds, no masses, no 

rebound or guarding. 


SKIN: no rashes and no bruising 


UPPER EXTREMITIES: upper extremities are grossly normal. 


LOWER EXTREMITIES: No pitting edema.


NEURO EXAM: Normal sensorium, cranial nerves II-XII  intact, normal speech,  no 

weakness of arms, no weakness of legs. No drift.  Gross sensation intact





Medical Decision & Procedures


ER Provider


Diagnostic Interpretation:





CT SCAN OF THE BRAIN WITHOUT IV CONTRAST





CLINICAL HISTORY: Head injury.





COMPARISON STUDY:  CT of the brain dated 2013.





TECHNIQUE: Unenhanced axial CT scan of the brain is performed from the vertex to


the skull base. Automated dose control exposure was utilized.





CT DOSE: 773.57 mGy.cm





FINDINGS:





Brain parenchyma: The brain parenchyma is normal in appearance. There is no


hemorrhage, mass effect, or evidence of acute territorial ischemia by CT


criteria. Gray-white matter is preserved. No extra-axial fluid collection is


seen.





Ventricles, sulci, cisterns: Normal in configuration.





Intracranial vasculature: The visualized intracranial vasculature at the skull


base is normal in appearance.





Calvarium: There is no depressed calvarial fracture. The patient is edentulous.





Sinuses and mastoids: The visualized paranasal sinuses are clear. The mastoid


air cells are well pneumatized.





Orbits: The bony orbits are grossly intact.








IMPRESSION: No acute intracranial abnormality.





Laboratory Results


3/28/17 20:40








3/28/17 20:40

















Test


  3/28/17


20:40


 


Red Blood Count


  4.89 M/uL


(4.2-5.4)


 


Mean Corpuscular Volume


  88.3 fL


()


 


Mean Corpuscular Hemoglobin


  31.1 pg


(25-34)


 


Mean Corpuscular Hemoglobin


Concent 35.2 g/dl


(32-36)


 


RDW Standard Deviation


  45.5 fL


(36.4-46.3)


 


RDW Coefficient of Variation


  14.1 %


(11.5-14.5)


 


Mean Platelet Volume


  10.6 fL


(7.4-10.4)


 


Anion Gap


  8.0 mmol/L


(3-11)


 


Est Creatinine Clear Calc


Drug Dose 98.8 ml/min 


 


 


Estimated GFR (


American) 105.4 


 


 


Estimated GFR (Non-


American 90.9 


 


 


BUN/Creatinine Ratio 12.6 (10-20) 


 


Calcium Level


  8.8 mg/dl


(8.5-10.1)











Medications Administered











 Medications


  (Trade)  Dose


 Ordered  Sig/Janice


 Route  Start Time


 Stop Time Status Last Admin


Dose Admin


 


 Sodium Chloride  1,000 ml @ 


 999 mls/hr  Q1H1M STAT


 IV  3/28/17 20:29


 3/28/17 21:29 DC 3/28/17 20:54


999 MLS/HR


 


 Prochlorperazine


 Edisylate/Syringe


  (Compazine Inj/


 Syringe)  10 ml @ 5


 mls/min  NOW  STAT


 IV  3/28/17 20:30


 3/28/17 20:32 DC 3/28/17 20:54


5 MLS/MIN


 


 Diphenhydramine


 HCl


  (Benadryl Inj)  50 mg  NOW  STAT


 IV  3/28/17 20:30


 3/28/17 20:32 DC 3/28/17 20:56


50 MG


 


 Ketorolac


 Tromethamine


  (Toradol Inj)  30 mg  NOW  STAT


 IV  3/28/17 20:30


 3/28/17 20:32 DC 3/28/17 20:55


30 MG











Medical Decision


Differential Diagnosis includes but is not limited to headache, tension headache

, cluster headache, migraine, subarachnoid hemorrhage, meningitis, mass, 

central venous thrombus, concussion, trauma and epidural/subdural hemorrhage.








42 yo F with hx of asthma, COPD , Vertigo, Migraines p/w Left Occipital 

Headache with pain radiating down LUE 


- Given 50mg Benadryl


- Given 30 mg Toradol


- Given1 L of fluid


-Given 10 mg Compazine





No evidence of Head injury. no known Trauma,  syncopal episode.  symptoms 

likely attributed to migraine which is supported by patient's history of 

migraines.





Upon reevaluation, the patient is feeling better. I discussed the findings and 

the treatment plan with the patient including relaxation, hydration and 

followup this week with PCP. She verbalizes agreement and understanding.  She 

was discharged home.





Impression





 Primary Impression:  


 Left-sided headache





Departure Information


Dispostion


Home / Self-Care





Referrals


SELMA Amezcua M.D. (MEDICAL) (PCP)





Patient Instructions


My [facility]





Resident Tracking


Resident Involvement:  Resident Care Provided


Care Provided:  Adult ED

## 2017-11-13 ENCOUNTER — HOSPITAL ENCOUNTER (EMERGENCY)
Dept: HOSPITAL 45 - C.EDB | Age: 42
Discharge: HOME | End: 2017-11-13
Payer: COMMERCIAL

## 2017-11-13 VITALS — DIASTOLIC BLOOD PRESSURE: 99 MMHG | SYSTOLIC BLOOD PRESSURE: 158 MMHG | TEMPERATURE: 98.06 F

## 2017-11-13 VITALS — OXYGEN SATURATION: 96 % | HEART RATE: 85 BPM

## 2017-11-13 VITALS
BODY MASS INDEX: 36.61 KG/M2 | WEIGHT: 201.5 LBS | BODY MASS INDEX: 36.61 KG/M2 | HEIGHT: 62.01 IN | WEIGHT: 201.5 LBS | HEIGHT: 62.01 IN

## 2017-11-13 DIAGNOSIS — L98.9: ICD-10-CM

## 2017-11-13 DIAGNOSIS — J45.909: ICD-10-CM

## 2017-11-13 DIAGNOSIS — S93.402A: Primary | ICD-10-CM

## 2017-11-13 DIAGNOSIS — I10: ICD-10-CM
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
## 2017-11-13 DIAGNOSIS — Z79.899: ICD-10-CM

## 2017-11-13 DIAGNOSIS — Z72.0: ICD-10-CM

## 2017-11-13 DIAGNOSIS — K31.9: ICD-10-CM
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
## 2017-11-13 NOTE — DIAGNOSTIC IMAGING REPORT
L ANKLE MIN 3 VIEWS ROUTINE



CLINICAL HISTORY: Left ankle pain following injury.    



COMPARISON: Left tibia and fibula radiographs October 14, 2012.



FINDINGS:  Alignment of the left ankle is anatomic. There is no acute fracture.

There is moderate lateral ankle soft tissue swelling. Talar dome is intact.

There is mild posterior calcaneal spurring at the insertion of the Achilles.



IMPRESSION: 



1. No acute fracture or dislocation of the left ankle.



2. Moderate lateral ankle soft tissue swelling.







Electronically signed by:  Carlton Jon M.D.

11/13/2017 4:14 PM



Dictated Date/Time:  11/13/2017 3:57 PM

## 2017-11-14 NOTE — EMERGENCY ROOM VISIT NOTE
ED Visit Note


First contact with patient:  15:32


Chief Complaint: I twisted my left ankle.





History of Present Illness: Ms. Bronson is a 42-year-old white female who 

ambulates into the ED complaining of left lateral ankle pain.


Patient reports approximately 10:00 last evening, proximally 15 hours ago, she 

reports she was walking in her yard [place] stepped in a hole and twisted her 

left ankle.  She reports at the time of the injury she started developing some 

mild pain over the lateral aspect and her pain has gradually increased in 

intensity.  She was able to walk and work throughout the day.


Currently she is complaining of a sharp and throbbing pain in and around the 

left lateral malleolus.  She rates her discomfort 9/10.  She has radiation of 

her pain into the distal fibula.  Her pain worsens with ambulation, inversion, 

plantar flexion, and palpation.  She reports taking Excedrin Migraine headache 

medication last night for pain without relief.  She has not taken a medication 

for pain prior to arrival at the hospital.  Associated with her pain she 

reports her toes are numb.


She denies hip pain, knee pain, lower leg pain, leg weakness, previous 

significant injuries or surgeries to the ankle.





Review of Systems: As noted above in history of present illness. 





Past Medical History: Hypertension, unspecified skin disorder, asthma, 

bronchitis, unspecified stomach disorder, status post tubal ligation, 

cholecystectomy and  section.


Current Medications: Albuterol, Excedrin Migraine, Zyrtec, Dulera.


Allergies to Medications: Patient denies.


Social History: Patient is currently employed; she feels safe in her home 

environment; she admits to tobacco use and denies alcohol use.





Physical Examination:


Vital Signs: 








  Date Time  Temp Pulse Resp B/P (MAP) Pulse Ox O2 Delivery O2 Flow Rate FiO2


 


17 16:40  85 16  96 Room Air  


 


17 15:29 36.7 94 16 158/99 97 Room Air  





GENERAL: 42-year-old female in mild to moderate distress due to pain, nontoxic-

appearing, afebrile and hemodynamically stable.


NEUROLOGICAL: Awake, alert and oriented to person, place and time.  Answering 

questions appropriately and following commands.  


SKIN: Warm, dry and pink.  No soft tissue trauma noted.


LEFT LOWER EXTREMITY: No gross bony deformity.  No tenderness in the hip or 

lower leg.  Moderate tenderness over the lateral malleolus and all of the 

surrounding ligamentous structures.  There is moderate swelling but no bony 

deformity or crepitus.  No ecchymosis.  Was not able to do range of motion 

exercises or ligamentous testing due to patient's pain.  Throughout the rest of 

the foot the skin was warm and pink and capillary refill is brisk.  Patient was 

able to distinguish light sensations but does report her toes and foot feel 

numb.





ED Course:


Patient is assessed as noted above.


Patient's medication list was reviewed.


Patient is given ice for pain and comfort.


Left Ankle X-Rays: Were read by myself and the radiologist showing no acute 

fractures or dislocations.  Moderate lateral ankle swelling.


Patient was placed in a gel splint and on a nonweightbearing crutches.


Patient was educated about today's findings and instructed on her treatment plan

; she verbalizes understanding and agreement with this plan.





Clinical Impression: Left ankle sprain.





Decision-Making: Initially my differential diagnosis I considered ankle sprain, 

ankle fracture, ankle dislocation, distal fibula fracture and other causes.





Disposition: Patient discharged home in stable condition; prior to departure 

she was reassessed and subjectively reported she was feeling better and rated 

her discomfort 4/10.





Plan:


Comfort measures were discussed with the patient including rest, ice, elevation

, splint and crutch use.


Patient was signed off of 3 days of work.


Patient is encouraged to follow-up with orthopedics if no better in 7-10 days.


Patient was encouraged return ED for worsening/uncontrolled pain, uncontrolled 

swelling, worsening foot numbness or any new/concerning symptoms.

## 2018-02-27 ENCOUNTER — HOSPITAL ENCOUNTER (EMERGENCY)
Dept: HOSPITAL 45 - C.EDB | Age: 43
Discharge: HOME | End: 2018-02-27
Payer: SELF-PAY

## 2018-02-27 VITALS — DIASTOLIC BLOOD PRESSURE: 98 MMHG | SYSTOLIC BLOOD PRESSURE: 146 MMHG | OXYGEN SATURATION: 98 % | HEART RATE: 85 BPM

## 2018-02-27 VITALS
BODY MASS INDEX: 34.41 KG/M2 | BODY MASS INDEX: 34.41 KG/M2 | HEIGHT: 62.01 IN | HEIGHT: 62.01 IN | WEIGHT: 189.38 LBS | WEIGHT: 189.38 LBS

## 2018-02-27 VITALS — TEMPERATURE: 98.24 F

## 2018-02-27 DIAGNOSIS — J44.9: ICD-10-CM

## 2018-02-27 DIAGNOSIS — J45.909: ICD-10-CM

## 2018-02-27 DIAGNOSIS — B34.9: Primary | ICD-10-CM

## 2018-02-27 DIAGNOSIS — Z82.49: ICD-10-CM

## 2018-02-27 DIAGNOSIS — Z90.49: ICD-10-CM

## 2018-02-27 DIAGNOSIS — F17.210: ICD-10-CM

## 2018-02-27 DIAGNOSIS — Z87.440: ICD-10-CM

## 2018-02-27 DIAGNOSIS — Z98.51: ICD-10-CM

## 2018-02-27 DIAGNOSIS — Z80.9: ICD-10-CM

## 2018-02-27 DIAGNOSIS — Z83.3: ICD-10-CM

## 2018-02-27 DIAGNOSIS — I10: ICD-10-CM

## 2018-02-27 DIAGNOSIS — Z79.899: ICD-10-CM

## 2018-02-27 LAB
ALBUMIN SERPL-MCNC: 4 GM/DL (ref 3.4–5)
ALP SERPL-CCNC: 45 U/L (ref 45–117)
ALT SERPL-CCNC: 26 U/L (ref 12–78)
AST SERPL-CCNC: 15 U/L (ref 15–37)
BASOPHILS # BLD: 0.04 K/UL (ref 0–0.2)
BASOPHILS NFR BLD: 0.5 %
BUN SERPL-MCNC: 6 MG/DL (ref 7–18)
CALCIUM SERPL-MCNC: 8.9 MG/DL (ref 8.5–10.1)
CO2 SERPL-SCNC: 26 MMOL/L (ref 21–32)
CREAT SERPL-MCNC: 0.86 MG/DL (ref 0.6–1.2)
EOS ABS #: 0.16 K/UL (ref 0–0.5)
EOSINOPHIL NFR BLD AUTO: 357 K/UL (ref 130–400)
GLUCOSE SERPL-MCNC: 89 MG/DL (ref 70–99)
HCT VFR BLD CALC: 42 % (ref 37–47)
HGB BLD-MCNC: 14.5 G/DL (ref 12–16)
IG#: 0.02 K/UL (ref 0–0.02)
IMM GRANULOCYTES NFR BLD AUTO: 31 %
INFLUENZA B ANTIGEN: (no result)
LIPASE: 96 U/L (ref 73–393)
LYMPHOCYTES # BLD: 2.57 K/UL (ref 1.2–3.4)
MCH RBC QN AUTO: 30.1 PG (ref 25–34)
MCHC RBC AUTO-ENTMCNC: 34.5 G/DL (ref 32–36)
MCV RBC AUTO: 87.3 FL (ref 80–100)
MONO ABS #: 0.71 K/UL (ref 0.11–0.59)
MONOCYTES NFR BLD: 8.6 %
NEUT ABS #: 4.79 K/UL (ref 1.4–6.5)
NEUTROPHILS # BLD AUTO: 1.9 %
NEUTROPHILS NFR BLD AUTO: 57.8 %
PMV BLD AUTO: 10.5 FL (ref 7.4–10.4)
POTASSIUM SERPL-SCNC: 3.5 MMOL/L (ref 3.5–5.1)
PROT SERPL-MCNC: 7.4 GM/DL (ref 6.4–8.2)
RED CELL DISTRIBUTION WIDTH CV: 13.7 % (ref 11.5–14.5)
RED CELL DISTRIBUTION WIDTH SD: 44 FL (ref 36.4–46.3)
SODIUM SERPL-SCNC: 137 MMOL/L (ref 136–145)
WBC # BLD AUTO: 8.29 K/UL (ref 4.8–10.8)

## 2018-02-27 NOTE — DIAGNOSTIC IMAGING REPORT
CHEST ONE VIEW PORTABLE



CLINICAL HISTORY: Shortness of breath.    



COMPARISON STUDY:  Chest radiograph November 7, 2017.



FINDINGS: Lung volumes are at the lower limits of normal. There is no

pneumothorax or pleural effusion. There is no consolidation or evidence of

pulmonary edema. Cardiomediastinal silhouette is unremarkable. 



IMPRESSION:  No acute cardiopulmonary findings. 









Electronically signed by:  Carlton Jon M.D.

2/27/2018 6:49 PM



Dictated Date/Time:  2/27/2018 6:49 PM

## 2018-02-27 NOTE — EMERGENCY ROOM VISIT NOTE
History


Report prepared by Angel:  Sukhdev Monroe


Under the Supervision of:  Dr. Wellington Jefferson D.O.


First contact with patient:  18:17


Chief Complaint:  DIZZY


Stated Complaint:  DIZZY, OFF BALANCE, FEELS LIKE COULD VOMIT/PASS OU


Nursing Triage Summary:  


Patient ambulatory to triage, slowly, with a steady gait, states "Yesterday, I 


woke up with a migraine. Today, around 1550, I got really lightheaded and 

almost 


passed out. I am really dizzy, lightheaded and nauseated. I have been coughing 

a 


lot throughout the week. I am an asthamatic. I have a lot of neck pain. I do 


have a history of vertigo."





History of Present Illness


The patient is a 42 year old female who presents to the Emergency Room with 

complaints of a near-syncopal episode that occurred today at 1550, roughly 4 

hours prior to arrival. The patient states that she had a migraine yesterday, 

which felt like her usual episodes. This morning when she woke up she "felt 

great" but then developed very mild intermittent headaches. The headache today 

was not nearly as bad as the migraines she has had in the past. The patient 

described the near-syncopal episode as a sudden onset lightheadedness that 

occurred as she was talking to her coworker. She notes that she began to fall 

forwards, before her coworker called her and stopped her from falling. She felt 

shaky with this episode. 30 minutes after this occurred she became nauseous. 

The patient currently has a mild headache in the back of her head. She 

continued to complain that she has experienced some increased/worsening 

shortness of breath lately that is now worse than her baseline chronic asthma. 

With the shortness of breath she has been coughing, which is producing a clear 

mucous.





   Source of History:  patient


   Onset:  4 hours PTA


   Position:  other (Global)


   Quality:  other (Near syncope )


   Associated Symptoms:  + headache, + cough, + SOB





Review of Systems


See HPI for pertinent positives & negatives. A total of 10 systems reviewed and 

were otherwise negative.





Past Medical & Surgical


Medical Problems:


(1) Acute bronchitis


(2) Acute bronchitis


(3) Acute bronchitis


(4) Ankle sprain


(5) Asthma exacerbation


(6) Asthma exacerbation


(7) Asthma exacerbation


(8) ASTHMA, UNSPECIFIED


(9) Asthmatic bronchitis


(10) Asthmatic bronchitis


(11) Back pain


(12) Back strain


(13) Bilateral otitis media


(14) Chest pain


(15) CHRONIC OBSTRUCTIVE ASTHMA, W (ACUTE) EXACERBATION


(16) Contusion of back wall of thorax


(17) COPD exacerbation


(18) Fall due to ice or snow


(19) FAM HX-DIABETES MELLITUS


(20) FAM HX-ISCHEM HEART DIS


(21) FAMILY HISTORY OF OTHER CARDIOVASCULAR DISEASES


(22) FAMILY HISTORY OF OTHER CARDIOVASCULAR DISEASES             


(23) FAMILY HX-MALIGNANCY NOS


(24) HISTORY OF TOBACCO USE


(25) Hypertension


(26) LUMBAGO


(27) MIGRAINE UNSPECIFIED W/O INTRACT MGRN W/O STATUS MIGRAINOSUS


(28) Rib pain


(29) Shortness of breath


(30) Spasm of back muscles


(31) urinary tract infection


Surgical Problems:


(1)  section


(2) Cholecystectomy


(3) Tubal ligation








Family History





Cancer


Diabetes mellitus


Heart disease


Hypertension


Lung disease





Social History


Smoking Status:  Current Every Day Smoker


Alcohol Use:  none


Drug Use:  none


Marital Status:  in relationship


Housing Status:  lives with friends


Occupation Status:  employed





Current/Historical Medications


Scheduled


Cetirizine (Zyrtec), 10 MG PO DAILY


Mometasone Furoate-Formoterol (Dulera 100/5 Mcg), 2 PUFFS INH BID





Scheduled PRN


Albuterol Hfa (Ventolin Hfa), 2 PUFFS INH Q4H PRN for Pain


Albuterol Sulf (Proventil 0.083% 2.5MG/3ML), 3 ML NEB QID PRN for SOB/Wheezing


Aspirin-Acetaminophen-Caffeine (Excedrin Migraine), 2 TABS PO Q8 PRN for 

Headache or Pain


Ranitidine HCl (Ranitidine 75), 75 MG PO DAILY PRN for ACID REFLUX





Allergies


Coded Allergies:  


     No Known Allergies (Unverified , 18)





Physical Exam


Vital Signs











  Date Time  Temp Pulse Resp B/P (MAP) Pulse Ox O2 Delivery O2 Flow Rate FiO2


 


18 20:33  85  146/98 98   


 


18 19:10  80  142/90 97 Room Air  


 


18 18:06 36.8 90 20 170/102 98 Room Air  











Physical Exam


GENERAL: Sitting up in bed, alert, well appearing, well nourished, no distress, 

non-toxic 


EYE EXAM: normal conjunctiva. PERRL and EOM's intact.


OROPHARYNX: no exudate, no erythema, lips, buccal mucosa, and tongue normal and 

mucous membranes are moist


NECK: supple, no nuchal rigidity, no adenopathy, non-tender


LUNGS: Clear to auscultation. Normal chest wall mechanics


HEART: no murmurs, S1 normal and S2 normal 


ABDOMEN: abdomen soft, non-tender, normo-active bowel sounds, no masses, no 

rebound or guarding. 


BACK: Back is symmetrical on inspection and there is no deformity, no midline 

tenderness, no CVA tenderness. 


SKIN: no rashes and no bruising 


UPPER EXTREMITIES: upper extremities are grossly normal. 


LOWER EXTREMITIES: No pitting edema.


NEURO EXAM: Normal sensorium, cranial nerves II-XII grossly intact, normal 

speech,  no gross weakness of arms, no gross weakness of legs. No drift. Finger 

to nose intact. Gross sensation intact.





Medical Decision & Procedures


ER Provider


Diagnostic Interpretation:


Radiology results as stated below per my review and the radiologist's 

interpretation: 





CHEST ONE VIEW PORTABLE





CLINICAL HISTORY: Shortness of breath.    





COMPARISON STUDY:  Chest radiograph 2017.





FINDINGS: Lung volumes are at the lower limits of normal. There is no


pneumothorax or pleural effusion. There is no consolidation or evidence of


pulmonary edema. Cardiomediastinal silhouette is unremarkable. 





IMPRESSION:  No acute cardiopulmonary findings. 














Electronically signed by:  Carlton Jon M.D.


2018 6:49 PM





Dictated Date/Time:  2018 6:49 PM





Laboratory Results


18 19:00








Red Blood Count 4.81, Mean Corpuscular Volume 87.3, Mean Corpuscular Hemoglobin 

30.1, Mean Corpuscular Hemoglobin Concent 34.5, Mean Platelet Volume 10.5, 

Neutrophils (%) (Auto) 57.8, Lymphocytes (%) (Auto) 31.0, Monocytes (%) (Auto) 

8.6, Eosinophils (%) (Auto) 1.9, Basophils (%) (Auto) 0.5, Neutrophils # (Auto) 

4.79, Lymphocytes # (Auto) 2.57, Monocytes # (Auto) 0.71, Eosinophils # (Auto) 

0.16, Basophils # (Auto) 0.04





18 19:00

















Test


  18


18:25 18


18:32 18


19:00


 


Urine Color YELLOW   


 


Urine Appearance CLEAR (CLEAR)   


 


Urine pH 6.5 (4.5-7.5)   


 


Urine Specific Gravity


  1.006


(1.000-1.030) 


  


 


 


Urine Protein NEG (NEG)   


 


Urine Glucose (UA) NEG (NEG)   


 


Urine Ketones NEG (NEG)   


 


Urine Occult Blood NEG (NEG)   


 


Urine Nitrite NEG (NEG)   


 


Urine Bilirubin NEG (NEG)   


 


Urine Urobilinogen NEG (NEG)   


 


Urine Leukocyte Esterase NEG (NEG)   


 


Urine WBC (Auto) 1-5 /hpf (0-5)   


 


Urine RBC (Auto) 0-4 /hpf (0-4)   


 


Urine Hyaline Casts (Auto) 0 /lpf (0-5)   


 


Urine Epithelial Cells (Auto)


  5-10 /lpf


(0-5) 


  


 


 


Urine Bacteria (Auto) NEG (NEG)   


 


Urine Pregnancy Test NEG (NEG)   


 


Influenza Type A Antigen


  


  Neg for Influ


A (NEG) 


 


 


Influenza Type B Antigen


  


  Neg for Influ


B (NEG) 


 


 


White Blood Count


  


  


  8.29 K/uL


(4.8-10.8)


 


Red Blood Count


  


  


  4.81 M/uL


(4.2-5.4)


 


Hemoglobin


  


  


  14.5 g/dL


(12.0-16.0)


 


Hematocrit   42.0 % (37-47) 


 


Mean Corpuscular Volume


  


  


  87.3 fL


()


 


Mean Corpuscular Hemoglobin


  


  


  30.1 pg


(25-34)


 


Mean Corpuscular Hemoglobin


Concent 


  


  34.5 g/dl


(32-36)


 


Platelet Count


  


  


  357 K/uL


(130-400)


 


Mean Platelet Volume


  


  


  10.5 fL


(7.4-10.4)


 


Neutrophils (%) (Auto)   57.8 % 


 


Lymphocytes (%) (Auto)   31.0 % 


 


Monocytes (%) (Auto)   8.6 % 


 


Eosinophils (%) (Auto)   1.9 % 


 


Basophils (%) (Auto)   0.5 % 


 


Neutrophils # (Auto)


  


  


  4.79 K/uL


(1.4-6.5)


 


Lymphocytes # (Auto)


  


  


  2.57 K/uL


(1.2-3.4)


 


Monocytes # (Auto)


  


  


  0.71 K/uL


(0.11-0.59)


 


Eosinophils # (Auto)


  


  


  0.16 K/uL


(0-0.5)


 


Basophils # (Auto)


  


  


  0.04 K/uL


(0-0.2)


 


RDW Standard Deviation


  


  


  44.0 fL


(36.4-46.3)


 


RDW Coefficient of Variation


  


  


  13.7 %


(11.5-14.5)


 


Immature Granulocyte % (Auto)   0.2 % 


 


Immature Granulocyte # (Auto)


  


  


  0.02 K/uL


(0.00-0.02)


 


D-Dimer


  


  


  < 190 ug/L FEU


(0-500)


 


Anion Gap


  


  


  7.0 mmol/L


(3-11)


 


Est Creatinine Clear Calc


Drug Dose 


  


  85.8 ml/min 


 


 


Estimated GFR (


American) 


  


  95.9 


 


 


Estimated GFR (Non-


American 


  


  82.7 


 


 


BUN/Creatinine Ratio   7.3 (10-20) 


 


Calcium Level


  


  


  8.9 mg/dl


(8.5-10.1)


 


Total Bilirubin


  


  


  0.3 mg/dl


(0.2-1)


 


Direct Bilirubin


  


  


  < 0.1 mg/dl


(0-0.2)


 


Aspartate Amino Transf


(AST/SGOT) 


  


  15 U/L (15-37) 


 


 


Alanine Aminotransferase


(ALT/SGPT) 


  


  26 U/L (12-78) 


 


 


Alkaline Phosphatase


  


  


  45 U/L


()


 


Troponin I


  


  


  < 0.015 ng/ml


(0-0.045)


 


Total Protein


  


  


  7.4 gm/dl


(6.4-8.2)


 


Albumin


  


  


  4.0 gm/dl


(3.4-5.0)


 


Lipase


  


  


  96 U/L


()





Laboratory results per my review.





Medications Administered











 Medications


  (Trade)  Dose


 Ordered  Sig/Janice


 Route  Start Time


 Stop Time Status Last Admin


Dose Admin


 


 Albuterol Sulfate


  (Ventolin 0.083%


 2.5MG/3ML Neb)  2.5 mg  NOW  STAT


 INH  18 18:30


 18 18:31 DC 18 18:48


2.5 MG











ECG Per My Interpretation


Indication:  other (Near syncope, dizziness)


Rate (beats per minute):  73


Rhythm:  normal sinus


Findings:  other (No PVCs, Normal axis, NO ST elevation/depression)





ED Course


ED COURSE: 


Vital signs were reviewed and showed hypertensive vitals


The patients medical record was reviewed


The above diagnostic studies were performed and reviewed.


ED treatments and interventions as stated above. 





: The patient was evaluated in room C6. A complete history and physical 

examination was performed.





: Ordered Albuterol Sulfate 2.5 mg INH. 





: Upon reevaluation, the patient is completely back to her baseline.I 

discussed my findings with the patient and she understands and agrees with the 

treatment plan.   


Based on the patients age, coexisting illnesses, exam and lab findings the 

decision to treat as an outpatient was made.


The patient remained stable while under my care.


The patient appeared well at the time of discharge.





Medical Decision


Differential diagnosis includes etiologies such as benign positional vertigo, 

dehydration, hypovolemia, anemia, tumor, infection, hypoglycemia, electrolyte 

abnormalities, cardiac sources, intracerebral event, toxicologic, neurologic, 

as well as others were entertained.





Patient is a 42-year-old female who presents to the ER for feeling very 

lightheaded, flushed and nauseous.  She notes she was shaking and almost passed 

out.  She remembers the entire event.  She notes she does have mild shortness 

of breath and believes this is secondary to her asthma.  She also has a little 

bit of a cough and runny nose.  CBC alone BMP, LFTs, bilirubin and lipase was 

normal.  Troponin was negative.  D-dimer was negative.  Influenza was negative.

  UA and pregnancy was negative.  Patient was updated at bedside.  She was 

given a neb treatment and felt significantly better.  He was discharged follow-

up with PCP as an outpatient. Discussed with Pt concerning signs and symptoms 

to watch out for. Pt was instructed to follow up with their PCP and discussed 

with the patient their option to return to the ED at anytime for persistent or 

worsening symptoms. The appropriate anticipatory guidance and out-patient 

management, including indications for return to the emergency department, were 

explained at length to the patient and understood.





Impression





 Primary Impression:  


 Lightheaded


 Additional Impression:  


 Viral URI





Scribe Attestation


The scribe's documentation has been prepared under my direction and personally 

reviewed by me in its entirety. I confirm that the note above accurately 

reflects all work, treatment, procedures, and medical decision making performed 

by me.





Departure Information


Dispostion


Home / Self-Care





Referrals


SELMA Amezcua M.D. (MEDICAL) (PCP)





Forms


HOME CARE DOCUMENTATION FORM,                                                 

               IMPORTANT VISIT INFORMATION





Patient Instructions


My [facility] [address]





Additional Instructions





Please follow up with your primary care doctor with in the next 24 hours.  Any 

worsening of your symptoms, please return to the ED immediately. This includes 

any fevers greater than 100.4, worsening pain, chest pain, shortness breath, 

persistent nausea, vomiting, unable to eat or drink, or any other concerning 

signs or symptoms from your standpoint.








No driving for the next 24 hours.





Problem Qualifiers